# Patient Record
Sex: FEMALE | Race: WHITE
[De-identification: names, ages, dates, MRNs, and addresses within clinical notes are randomized per-mention and may not be internally consistent; named-entity substitution may affect disease eponyms.]

---

## 2017-01-29 ENCOUNTER — HOSPITAL ENCOUNTER (OUTPATIENT)
Dept: HOSPITAL 80 - FIMAGING | Age: 62
End: 2017-01-29
Attending: NURSE PRACTITIONER
Payer: MEDICAID

## 2017-01-29 DIAGNOSIS — M25.542: ICD-10-CM

## 2017-01-29 DIAGNOSIS — M50.31: Primary | ICD-10-CM

## 2017-01-29 DIAGNOSIS — Z85.3: ICD-10-CM

## 2017-01-29 DIAGNOSIS — M25.532: ICD-10-CM

## 2017-01-29 DIAGNOSIS — M50.321: ICD-10-CM

## 2017-01-29 NOTE — DX
Left Wrist, Three Views



History: Wrist pain. History of metastatic breast cancer.



Findings: There is diffuse demineralization. No evidence for acute fracture or dislocation. No signif
icant joint narrowing or periarticular erosion.



Impression: Diffuse demineralization. No definite acute fracture.

## 2017-01-29 NOTE — DX
Cervical Spine, Two Views



History: Pain. History of metastatic breast cancer.



Findings: No evidence for a fracture. No significant spondylolisthesis. Facet arthropathy is seen hernesto
aterally at multiple levels. This is more severe on the left at C2-C3, C3-C4, and C4-C5. Uncovertebra
l joint hypertrophy is seen bilaterally. No evidence for aggressive osseous lesion. No evidence for p
revertebral soft tissue swelling.



Impression: Multilevel degenerative joint disease of the cervical spine, more severe on the left at C
2-C3 through C4-C5.

## 2017-05-10 ENCOUNTER — HOSPITAL ENCOUNTER (EMERGENCY)
Dept: HOSPITAL 80 - CED | Age: 62
Discharge: LEFT BEFORE DISCHARGE | End: 2017-05-10
Payer: MEDICAID

## 2017-05-10 VITALS — OXYGEN SATURATION: 91 % | SYSTOLIC BLOOD PRESSURE: 170 MMHG | DIASTOLIC BLOOD PRESSURE: 112 MMHG | HEART RATE: 88 BPM

## 2017-05-10 VITALS — RESPIRATION RATE: 28 BRPM

## 2017-05-10 VITALS — TEMPERATURE: 98.6 F

## 2017-05-10 DIAGNOSIS — Z85.118: ICD-10-CM

## 2017-05-10 DIAGNOSIS — J90: ICD-10-CM

## 2017-05-10 DIAGNOSIS — J98.11: ICD-10-CM

## 2017-05-10 DIAGNOSIS — Z85.3: ICD-10-CM

## 2017-05-10 DIAGNOSIS — R09.02: Primary | ICD-10-CM

## 2017-05-10 LAB
% IMMATURE GRANULYOCYTES: 0.4 % (ref 0–1.1)
ABSOLUTE IMMATURE GRANULOCYTES: 0.02 10^3/UL (ref 0–0.1)
ABSOLUTE NRBC COUNT: 0 10^3/UL (ref 0–0.01)
ADD DIFF?: NO
ADD MORPH?: NO
ADD SCAN?: NO
ALBUMIN SERPL-MCNC: 3.7 G/DL (ref 3.5–5)
ALP SERPL-CCNC: 94 IU/L (ref 38–126)
ALT SERPL-CCNC: 46 IU/L (ref 9–52)
ANION GAP SERPL CALC-SCNC: 14 MEQ/L (ref 8–16)
APTT BLD: 31.6 SEC (ref 23–38)
AST SERPL-CCNC: 42 IU/L (ref 14–46)
ATYPICAL LYMPHOCYTE FLAG: 10 (ref 0–99)
BILIRUB SERPL-MCNC: 0.8 MG/DL (ref 0.1–1.4)
BILIRUBIN-CONJUGATED: 0.5 MG/DL (ref 0–0.5)
BILIRUBIN-UNCONJUGATED: 0.3 MG/DL (ref 0–1.1)
CALCIUM SERPL-MCNC: 9.1 MG/DL (ref 8.5–10.4)
CHLORIDE SERPL-SCNC: 100 MEQ/L (ref 97–110)
CK-MB INTERPRETATION: POSITIVE
CO2 SERPL-SCNC: 23 MEQ/L (ref 22–31)
CREAT SERPL-MCNC: 0.3 MG/DL (ref 0.6–1)
CREATINE KINASE-MB FRACTION: 5.74 NG/ML (ref 0–4.55)
ERYTHROCYTE [DISTWIDTH] IN BLOOD BY AUTOMATED COUNT: 13.5 % (ref 11.5–15.2)
FRAGMENT RBC FLAG: 0 (ref 0–99)
GFR SERPL CREATININE-BSD FRML MDRD: > 60 ML/MIN/{1.73_M2}
GLUCOSE SERPL-MCNC: 93 MG/DL (ref 70–100)
HCT VFR BLD CALC: 45.1 % (ref 38–47)
HGB BLD-MCNC: 15.7 G/DL (ref 12.6–16.3)
INR PPP: 1.01 (ref 0.83–1.16)
LEFT SHIFT FLG: 0 (ref 0–99)
LIPEMIA HEMOLYSIS FLAG: 90 (ref 0–99)
MAGNESIUM SERPL-MCNC: 1.8 MG/DL (ref 1.6–2.3)
MCH RBC BLDCO QN: 34.1 PG (ref 27.9–34.1)
MCHC RBC AUTO-ENTMCNC: 34.8 G/DL (ref 32.4–36.7)
MCV RBC AUTO: 98 FL (ref 81.5–99.8)
NRBC-AUTO%: 0 % (ref 0–0.2)
PLATELET # BLD: 240 10^3/UL (ref 150–400)
PLATELET CLUMPS FLAG: 20 (ref 0–99)
PMV BLD AUTO: 9 FL (ref 8.7–11.7)
POTASSIUM SERPL-SCNC: 4.2 MEQ/L (ref 3.5–5.2)
PROT SERPL-MCNC: 6.8 G/DL (ref 6.3–8.2)
PROTHROMBIN TIME: 13 SEC (ref 12–15)
RBC # BLD AUTO: 4.6 10^6/UL (ref 4.18–5.33)
SODIUM SERPL-SCNC: 137 MEQ/L (ref 134–144)
TROPONIN I SERPL-MCNC: < 0.012 NG/ML (ref 0–0.03)

## 2017-05-10 NOTE — CPEKG
Heart Rate: 101

RR Interval: 594

P-R Interval: 140

QRSD Interval: 82

QT Interval: 356

QTC Interval: 462

P Axis: 55

QRS Axis: 29

T Wave Axis: 38

EKG Severity - OTHERWISE NORMAL ECG -

EKG Impression: SINUS TACHYCARDIA

Electronically Signed By: Amandeep Valencia 11-May-2017 14:25:43

## 2017-05-10 NOTE — EDPHY
H & P


HPI/ROS: 





HPI





CHIEF COMPLAINT:  Shortness of breath





HISTORY OF PRESENT ILLNESS:  Patient very pleasant 62-year-old female she does 

have significant past medical history for breast cancer metastatic to her lungs 

and bone, she has a left pleural chest tube left posterior region that she 

drained yesterday.  She also has a history of muscular dystrophy, as well as 

anxiety. History of pneumonia.  She presents emergency room feeling short of 

breath progressively worse over the past week.  No fever.  She does have a 

cough but nonproductive.  She does wear oxygen at night only and CPAP.  Main 

complaint is worsening shortness of breath over the past week.  She tells me 

she has no history of PE.  She does tell me she has metastatic breast CA to her 

lung.  She does see Dr. Negrete with Oncology but is not undergoing any treatment 

at this time.





Past Medical History:  Breast CA, metastatic to lung and bone, muscular 

dystrophy, anxiety





Past Surgical History:  Mastectomy, left posterior chest pleural catheter





Social History:  Smokes tobacco, denies drugs alcohol 





Family History:  Noncontributory








ROS   


REVIEW OF SYSTEMS:


A comprehensive 10 point review of systems is otherwise negative aside from 

elements mentioned in the history of present illness.








Exam   


Constitutional   triage nursing summary reviewed, vital signs reviewed, awake/

alert.  Tachypnea, low O2.


Eyes   normal conjunctivae and sclera, EOMI, PERRLA. 


HENT   normal inspection, atraumatic, moist mucus membranes, no epistaxis, neck 

supple/ no meningismus, no raccoon eyes. 


Respiratory   tachypnea, decreased breath sounds bilaterally worse on the left 

than right


Cardiovascular   rate normal, regular rhythm, no murmur, no edema, distal 

pulses normal. 


Gastrointestinal   soft, non-tender, no rebound, no guarding, normal bowel 

sounds, no distension, no pulsatile mass. 


Genitourinary   no CVA tenderness. 


Musculoskeletal  no midline vertebral tenderness, full range of motion, no calf 

swelling, no tenderness of extremities, no meningismus, good pulses, 

neurovascularly intact.


Skin   pink, warm, & dry, no rash, skin atraumatic. 


Neurologic   awake, alert and oriented x 3, AAOx3, moves all 4 extremities 

equally, motor intact, sensory intact, CN II-XII intact, normal cerebellar, 

normal vision, normal speech. 


Psychiatric   normal mood/affect. 


Heme/Lymph/Immune   no lymphadenopathy.





Differential Diagnosis:  Includes but is not limited to in a particular order, 

pneumonia, pulmonary embolism, pleural effusion, pneumothorax, CHF, ACS





Medical Decision Making:  Plan for patient IV establishment full cardiac monitor

, EKG, IV fluid bolus, lactic acid blood cultures, two view chest x-ray D-dimer 

for PE.  DuoNeb breathing treatment.





Re-evaluation:








EKG interpretation by me on record in [employer] system.  Impression time of 

EKG 17 10, this is sinus tachycardia rate of 101. Do not appreciate acute 

ischemic change.  No ST elevations significant ST depression T-wave abnormality.





[id number]:  Patient received breathing treatment she states admitted very mild 

difference however still short of breath.  Mildly tachypneic.  X-ray has been 

reviewed shows left-sided pleural effusion haziness throughout both lung 

fields.  Is noted this patient has a positive D-dimer.  The setting of 

tachycardia and shortness of breath history of cancer will need to perform a CT 

angiogram to rule out pulmonary embolism.  Troponin still pending.





CT scan of the angiogram chest with IV contrast.  The results of the study are 

this shows no pulmonary embolism however does show moderate left-sided pleural 

effusion, ear broncho backwards and atelectasis, no focal pneumonia, osseous 

lesion left 7th rib with a pathological fracture, possible small osseous lesion 

left left 11th rib otherwise no acute other pathology in the chest..  The study 

was read by Dr. Feng Abreu  I viewed the images myself on the PACS system.





1941:  I had a very long discussion with the patient she is refusing hospital 

admission.  I explained that I would like to hospitalize her for tachypnea and 

low oxygen level left-sided pleural effusion atelectasis.  Given that she has 

muscular dystrophy in her muscles are chronically weak has a respiratory 

process going on she understands would like to keep her in the hospital for 

observation close monitor respiratory status.  However she is declining 

hospitalization refusing hospitalization.  She would like to go home.  I 

explained that she will need to sign out against medical advice as I explained 

to her that I would really like to keep her and that if she leaves there is a 

great chance of increasing morbidity, even mortality or worsening respiratory 

status.  Respiratory decline.  She still is refusing hospitalization she does 

understand the risk of this.  She would like to go home.  I explained I will 

prescribe her azithromycin at her request an albuterol inhaler however if she 

has any worsening symptoms at any time including worsening trouble breathing, 

shortness of breath chest pain does not feel well she needs to seek medical 

attention in the emergency room she understands.  Patient has signed out 

against medical advice.  AMA form has been signed.  Witnessed by Aminah Rojas RN. 


Source: Patient





- Personal History


Tetanus Vaccine Date: WITHIN 10 YRS





- Medical/Surgical History


Hx Asthma: No


Hx Chronic Respiratory Disease: Yes


Hx Diabetes: No


Hx Cardiac Disease: No


Hx Renal Disease: No


Hx Cirrhosis: No


Hx Alcoholism: No


Hx HIV/AIDS: No


Hx Splenectomy or Spleen Trauma: No


Other PMH: MD,MASTECTOMY,KIDNEY STONES,RESP-PNEUMONIA X2 THIS PAST YEAR(2014)

WEARS HOME O2 AT HS





- Social History


Smoking Status: Never smoked


Constitutional: 


 Initial Vital Signs











Temperature (C)  37 C   05/10/17 16:37


 


Heart Rate  102 H  05/10/17 16:37


 


Respiratory Rate  18   05/10/17 16:37


 


Blood Pressure  139/91 H  05/10/17 16:37


 


O2 Sat (%)  91 L  05/10/17 16:37








 











O2 Delivery Mode               Nasal Cannula


 


O2 (L/minute)                  2














Allergies/Adverse Reactions: 


 





Sulfa (Sulfonamide Antibiotics) Allergy (Intermediate, Verified 02/19/15 13:01)


 








Home Medications: 














 Medication  Instructions  Recorded


 


Muscle Relaxers  08/17/15


 


AZITHROMYCIN [Z-PACK] 250 mg PO DAILY #6 tab 05/10/17


 


Albuterol [Proventil Inhaler HFA 1 - 2 puffs IH Q4H #1 mdi 05/10/17





(*)]  














Medical Decision Making





- Diagnostics


Imaging Results: 


 Imaging Impressions





Chest X-Ray  05/10/17 17:02


Impression:


1. Stable position of [place] drain left hemithorax.


2. Stable mild to moderate left effusion with possible small right effusion 

layering posteriorly.


3. Lytic lesion suspected posterior lateral left seventh rib.








Chest/Thorax CTA  05/10/17 17:57


Impression:


 


1. There is no CT evidence of pulmonary artery thromboemboli.


2. Status post right mastectomy and right axillary nichelle dissection.


3. Moderate left pleural effusion with a percutaneous drain present and left 

lower lobe atelectasis and infiltrate with peribronchial thickening, left 

greater than right. There is also some very mild subsegmental atelectasis at 

the right costophrenic angle.


4. Osseous metastases, with a pathologic fracture of the left seventh posterior 

rib.


 


Findings were discussed with Christian Molina MD  at 19:24, on 5/10/2017.


 














- Data Points


Laboratory Results: 


 Laboratory Results





 05/10/17 17:20 





 05/10/17 17:20 





 











  05/10/17 05/10/17 05/10/17





  17:20 17:20 17:20


 


WBC      





    


 


RBC      





    


 


Hgb      





    


 


Hct      





    


 


MCV      





    


 


MCH      





    


 


MCHC      





    


 


RDW      





    


 


Plt Count      





    


 


MPV      





    


 


Neut % (Auto)      





    


 


Lymph % (Auto)      





    


 


Mono % (Auto)      





    


 


Eos % (Auto)      





    


 


Baso % (Auto)      





    


 


Nucleat RBC Rel Count      





    


 


Absolute Neuts (auto)      





    


 


Absolute Lymphs (auto)      





    


 


Absolute Monos (auto)      





    


 


Absolute Eos (auto)      





    


 


Absolute Basos (auto)      





    


 


Absolute Nucleated RBC      





    


 


Immature Gran %      





    


 


Immature Gran #      





    


 


PT      13.0 SEC SEC





     (12.0-15.0) 


 


INR      1.01 





     (0.83-1.16) 


 


APTT      31.6 SEC SEC





     (23.0-38.0) 


 


D-Dimer      0.94 ug/mLFEU H ug/mLFEU





     (0.00-0.50) 


 


VBG Lactic Acid  1.0 mmol/L mmol/L    





   (0.7-2.1)   


 


Sodium    137 mEq/L mEq/L  





    (134-144)  


 


Potassium    4.2 mEq/L mEq/L  





    (3.5-5.2)  


 


Chloride    100 mEq/L mEq/L  





    ()  


 


Carbon Dioxide    23 mEq/l mEq/l  





    (22-31)  


 


Anion Gap    14 mEq/L mEq/L  





    (8-16)  


 


BUN    10 mg/dL mg/dL  





    (7-23)  


 


Creatinine    0.3 mg/dL L mg/dL  





    (0.6-1.0)  


 


Estimated GFR    > 60   





    


 


Glucose    93 mg/dL mg/dL  





    ()  


 


Calcium    9.1 mg/dL mg/dL  





    (8.5-10.4)  


 


Magnesium    1.8 mg/dL mg/dL  





    (1.6-2.3)  


 


Total Bilirubin    0.8 mg/dL mg/dL  





    (0.1-1.4)  


 


Conjugated Bilirubin    0.5 mg/dL mg/dL  





    (0.0-0.5)  


 


Unconjugated Bilirubin    0.3 mg/dL mg/dL  





    (0.0-1.1)  


 


AST    42 IU/L IU/L  





    (14-46)  


 


ALT    46 IU/L IU/L  





    (9-52)  


 


Alkaline Phosphatase    94 IU/L IU/L  





    ()  


 


Creatine Kinase    121 IU/L IU/L  





    (0-156)  


 


CK-MB (CK-2) Fraction    5.74 ng/mL H ng/mL  





    (0-4.55)  


 


CK-MB (CK-2) %    4.7 % H %  





    (0.0-4.0)  


 


Creatine Kinase Interp    POSITIVE  H   





    (NEGATIVE)  


 


Troponin I    < 0.012 ng/mL ng/mL  





    (0-0.034)  


 


NT-Pro-B Natriuret Pep    215 pg/mL H pg/mL  





    (0-125)  


 


Total Protein    6.8 g/dL g/dL  





    (6.3-8.2)  


 


Albumin    3.7 g/dL g/dL  





    (3.5-5.0)  


 


Lipase    96.0 IU/L IU/L  





    ()  














  05/10/17





  17:20


 


WBC  5.11 10^3/uL 10^3/uL





   (3.80-9.50) 


 


RBC  4.60 10^6/uL 10^6/uL





   (4.18-5.33) 


 


Hgb  15.7 g/dL g/dL





   (12.6-16.3) 


 


Hct  45.1 % %





   (38.0-47.0) 


 


MCV  98.0 fL fL





   (81.5-99.8) 


 


MCH  34.1 pg pg





   (27.9-34.1) 


 


MCHC  34.8 g/dL g/dL





   (32.4-36.7) 


 


RDW  13.5 % %





   (11.5-15.2) 


 


Plt Count  240 10^3/uL 10^3/uL





   (150-400) 


 


MPV  9.0 fL fL





   (8.7-11.7) 


 


Neut % (Auto)  62.3 % %





   (39.3-74.2) 


 


Lymph % (Auto)  24.9 % %





   (15.0-45.0) 


 


Mono % (Auto)  10.6 % %





   (4.5-13.0) 


 


Eos % (Auto)  1.2 % %





   (0.6-7.6) 


 


Baso % (Auto)  0.6 % %





   (0.3-1.7) 


 


Nucleat RBC Rel Count  0.0 % %





   (0.0-0.2) 


 


Absolute Neuts (auto)  3.19 10^3/uL 10^3/uL





   (1.70-6.50) 


 


Absolute Lymphs (auto)  1.27 10^3/uL 10^3/uL





   (1.00-3.00) 


 


Absolute Monos (auto)  0.54 10^3/uL 10^3/uL





   (0.30-0.80) 


 


Absolute Eos (auto)  0.06 10^3/uL 10^3/uL





   (0.03-0.40) 


 


Absolute Basos (auto)  0.03 10^3/uL 10^3/uL





   (0.02-0.10) 


 


Absolute Nucleated RBC  0.00 10^3/uL 10^3/uL





   (0-0.01) 


 


Immature Gran %  0.4 % %





   (0.0-1.1) 


 


Immature Gran #  0.02 10^3/uL 10^3/uL





   (0.00-0.10) 


 


PT  





  


 


INR  





  


 


APTT  





  


 


D-Dimer  





  


 


VBG Lactic Acid  





  


 


Sodium  





  


 


Potassium  





  


 


Chloride  





  


 


Carbon Dioxide  





  


 


Anion Gap  





  


 


BUN  





  


 


Creatinine  





  


 


Estimated GFR  





  


 


Glucose  





  


 


Calcium  





  


 


Magnesium  





  


 


Total Bilirubin  





  


 


Conjugated Bilirubin  





  


 


Unconjugated Bilirubin  





  


 


AST  





  


 


ALT  





  


 


Alkaline Phosphatase  





  


 


Creatine Kinase  





  


 


CK-MB (CK-2) Fraction  





  


 


CK-MB (CK-2) %  





  


 


Creatine Kinase Interp  





  


 


Troponin I  





  


 


NT-Pro-B Natriuret Pep  





  


 


Total Protein  





  


 


Albumin  





  


 


Lipase  





  











Medications Given: 


 








Discontinued Medications





Albuterol/Ipratropium (Duoneb)  3 ml IH EDNOW ONE


   Stop: 05/10/17 17:03


   Last Admin: 05/10/17 17:45 Dose:  3 ml


Sodium Chloride (Ns)  1,000 mls @ 0 mls/hr IV ONCE ONE


   PRN Reason: Wide Open


   Stop: 05/10/17 17:03


   Last Admin: 05/10/17 17:40 Dose:  1,000 mls








Departure





- Departure


Disposition: Against Medical Advice


Clinical Impression: 


 Hypoxia, Pleural effusion, Atelectasis





Dyspnea


Qualifiers:


 Dyspnea type: unspecified Qualified Code(s): R06.00 - Dyspnea, unspecified





Condition: Fair


Instructions:  Dyspnea (ED), Hypoxia (ED)


Additional Instructions: 


1. Please return emergency room if he changed her mind about hospital admission.


2. Return emergency room immediately if you have worsening symptoms includes 

worsening shortness of breath, chest pain you do not feel well.


Referrals: 


RIYA MOYA,. [Primary Care Provider] - As per Instructions


Prescriptions: 


Albuterol [Proventil Inhaler HFA (*)] 1 - 2 puffs IH Q4H #1 mdi


AZITHROMYCIN [Z-PACK] 250 mg PO DAILY #6 tab

## 2017-05-17 ENCOUNTER — HOSPITAL ENCOUNTER (EMERGENCY)
Dept: HOSPITAL 80 - CED | Age: 62
Discharge: HOME | End: 2017-05-17
Payer: MEDICAID

## 2017-05-17 VITALS
TEMPERATURE: 98.6 F | HEART RATE: 108 BPM | RESPIRATION RATE: 20 BRPM | DIASTOLIC BLOOD PRESSURE: 92 MMHG | OXYGEN SATURATION: 87 % | SYSTOLIC BLOOD PRESSURE: 165 MMHG

## 2017-05-17 DIAGNOSIS — C79.81: Primary | ICD-10-CM

## 2017-05-17 DIAGNOSIS — M89.9: ICD-10-CM

## 2017-05-17 DIAGNOSIS — R20.2: ICD-10-CM

## 2017-05-17 DIAGNOSIS — M84.48XA: ICD-10-CM

## 2017-05-17 NOTE — EDPHY
H & P


Stated Complaint: LEFT POSTERIOR LUNG DRAINAGE TUBE PAIN AT SITE SINCE SATURDAY


Time Seen by Provider: 05/17/17 16:39


HPI/ROS: 





This patient has metastatic breast cancer and presents with increasing left 

posterior chest wall pain. She is concerned about possible infection at her 

pulmonary catheter site posteriorly on the left.  She was seen here 7 days ago 

by Dr. Molina who work the patient up for increased dyspnea at that time and 

given an elevated D-dimer at that time she underwent CT angio chest which ruled 

out PE, confirmed longstanding left-sided effusion for which she has pulmonic 

drain in revealed a lytic lesion with pathological fracture of the left 7th rib 

posteriorly, lytic lesion to the level throat posteriorly and lytic lesion to 

T12 centrally.  The patient admits that she is not taking Percocet because it 

makes her constipated but reports that she had difficulty sleeping due to the 

posterior rib pain last night.  She feels that her dyspnea has improved.  She 

is interested in having more antibiotics and she felt that her symptoms of 

dyspnea improved with treatment with the Z-Mckinley that she received during her 

last visit for treatment of possible bronchitis.





ROS:  No fevers or chills. No other constitutional symptoms except baseline 

fatigue and weakness


HEENT:  No complaints except for a sore throat in the mornings for quite some 

time.  She requests strep test.


Pulmonary:  Ongoing dyspnea somewhat improved from last week and at baseline 

currently.  She reports 800 out from her pulmonic drain over the last 24 hours.


Cardiovascular:  No lightheadedness.  No heart palpitations.  No anterior chest 

pain. No lower extremity swelling or calf pain currently


GI:  She has chronic nausea and occasionally vomits.  No recent change in that.

  No belly pain at this time.


Neuro:  She reports feeling of paresthesias occasional numbness around the T12 

dermatome anteriorly.  No other new neuro symptoms.


Integumentary:  She reports some scabbing at the site of the pulmonic drain 

posteriorly.  No skin rash.


Complete review of symptoms is otherwise negative.


Source: Patient


Exam Limitations: No limitations





- Personal History


Tetanus Vaccine Date: WITHIN 10 YRS





- Medical/Surgical History


PMH: 





Metastatic lung cancer





Patient was on estrogen inhibitor that she stop taking in February of her own 

accord for reasons that are not entirely clear at this time.








Hx Asthma: No


Hx Chronic Respiratory Disease: Yes


Hx Diabetes: No


Hx Cardiac Disease: No


Hx Renal Disease: No


Hx Cirrhosis: No


Hx Alcoholism: No


Hx HIV/AIDS: No


Hx Splenectomy or Spleen Trauma: No


Other PMH: MD,MASTECTOMY,KIDNEY STONES,RESP-PNEUMONIA X2 THIS PAST YEAR(2014)

WEARS HOME O2 AT HS





- Social History


Smoking Status: Never smoked


Constitutional: 


 Initial Vital Signs











Temperature (C)  37.0 C   05/17/17 16:20


 


Heart Rate  108 H  05/17/17 16:20


 


Respiratory Rate  20   05/17/17 16:20


 


Blood Pressure  165/92 H  05/17/17 16:20


 


O2 Sat (%)  87 L  05/17/17 16:20








 











O2 Delivery Mode               Room Air














Allergies/Adverse Reactions: 


 





Sulfa (Sulfonamide Antibiotics) Allergy (Intermediate, Verified 05/17/17 16:37)


 








Home Medications: 














 Medication  Instructions  Recorded


 


Muscle Relaxers  08/17/15


 


AZITHROMYCIN [Z-PACK] 250 mg PO DAILY #6 tab 05/10/17


 


Albuterol [Proventil Inhaler HFA 1 - 2 puffs IH Q4H #1 mdi 05/10/17





(*)]  


 


Docusate Sodium [Colace 100 MG (*)] 100 mg PO BID #30 cap 05/17/17


 


Lidocaine 5% [Lidoderm 5% Patch 2 ea TD DAILY #30 patch 05/17/17





(*)]  


 


oxyCODONE/APAP 5/325 [Percocet 1 - 2 tab PO Q4-6PRN PRN #12 tab 05/17/17





5/325 (*)]  














Medical Decision Making


ED Course/Re-evaluation: 





Discussion:  I had an extensive talk with this patient regarding her current 

symptoms and possibilities for workup.  Clinically I feel her posterior rib 

pain is consistent with the 7th rib pathologic fracture and 11th lytic lesion 

noted on her CT angio chest on 5/10/17.  I think that her paresthesias & 

intermittent numbness in the T12 dermatome correspond to her T12 lytic lesion.  

However, she has no acute motor sx's or long-track symptoms/findings. She 

seemed r somewhat reassured knowing that there is some anatomic correlation 

between her current somatic symptoms and underlying lytic lesions and 

pathologic fx noted on CT chest results from a week ago.  She is not interested 

in pursuing further diagnostic workup at this time & again current findings are 

c/w findings on workup from 7 days PTA.  We had a jessy conversation about 

whether she feels she wants to enter a palliative approach to her metastatic 

cancer versus further cancer treatment.  She agrees to follow up with Dr. Negrete

, her oncologist to discuss her treatment plan.  I also discussed other 

analgesic possibilities including lidocaine patch, and NSAIDs.  Patient 

requests Percocet here and is given a Percocet PO.





- Data Points


Laboratory Results: 


 











  05/17/17 05/17/17





  Unknown 17:40


 


Group A Strep Screen    NEGATIVE 





    (NEGATIVE) 


 


Group A Strep DNA  Pending   





   











Medications Given: 


 








Discontinued Medications





Ondansetron HCl (Zofran Odt)  4 mg PO EDNOW ONE


   Stop: 05/17/17 16:59


   Last Admin: 05/17/17 17:00 Dose:  4 mg


Oxycodone/Acetaminophen (Percocet 5/325)  1 tab PO EDNOW ONE


   Stop: 05/17/17 17:22


   Last Admin: 05/17/17 17:25 Dose:  1 tab








Departure





- Departure


Disposition: Home, Routine, Self-Care


Clinical Impression: 


 Pathologic 7th rib fracture, T12 lytic lesion, Paresthesias, Metastatic breast 

cancer





Condition: Good


Instructions:  Rib Fracture (ED)


Additional Instructions: 


Diagnoses:  1. Pathologic fracture to the left 7th rib from lytic bony lesion 

2.  11th posterior rib lytic lesion 3. T12 vertebrae lytic lesion





I think that your current pain is attributable to your rib lesions and fracture.





Paresthesias or/tingling in the anterior belly are likely related to the 

thoracic spine lesion.





Plan:  Call Dr. Negrete tomorrow morning to arrange follow-up appointment for 

sometime this week to discuss your plan for your cancer treatment.





Consider adding Lidoderm patches over painful areas of your ribs to help 

control the pain





Take Colace stool softener while your on the Percocet to prevent constipation





Remember that she can take Tylenol with the Percocet with a dose of 1000 mg per 

6 hours if needed for pain control.





Return for any significant worsening despite the treatment plan.








Referrals: 


RIYA MOYA,. [Primary Care Provider] - As per Instructions


Leon Negrete MD [Medical Doctor] - As per Instructions


Prescriptions: 


Docusate Sodium [Colace 100 MG (*)] 100 mg PO BID #30 cap


Lidocaine 5% [Lidoderm 5% Patch (*)] 2 ea TD DAILY #30 patch


oxyCODONE/APAP 5/325 [Percocet 5/325 (*)] 1 - 2 tab PO Q4-6PRN PRN #12 tab


 PRN Reason: Pain

## 2017-10-27 ENCOUNTER — HOSPITAL ENCOUNTER (EMERGENCY)
Dept: HOSPITAL 80 - FED | Age: 62
Discharge: HOME | End: 2017-10-27
Payer: MEDICAID

## 2017-10-27 ENCOUNTER — HOSPITAL ENCOUNTER (EMERGENCY)
Dept: HOSPITAL 80 - CED | Age: 62
Discharge: LEFT BEFORE DISCHARGE | End: 2017-10-27
Payer: MEDICAID

## 2017-10-27 VITALS — RESPIRATION RATE: 16 BRPM | DIASTOLIC BLOOD PRESSURE: 90 MMHG | SYSTOLIC BLOOD PRESSURE: 142 MMHG | HEART RATE: 98 BPM

## 2017-10-27 VITALS — TEMPERATURE: 98.6 F

## 2017-10-27 VITALS — OXYGEN SATURATION: 93 %

## 2017-10-27 DIAGNOSIS — C50.919: ICD-10-CM

## 2017-10-27 DIAGNOSIS — Z53.21: Primary | ICD-10-CM
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
## 2017-10-27 DIAGNOSIS — F17.200: ICD-10-CM

## 2017-10-27 DIAGNOSIS — T85.698A: Primary | ICD-10-CM

## 2017-10-27 NOTE — EDPHY
General


Narrative: 





CHIEF COMPLAINT: 


Malfunction of chest tube





HISTORY OF PRESENT ILLNESS: 


Patient presents with complaints of possible in a function of chest tube this 

started on Wednesday evening.  She presents with her daughter in law at 

bedside.  She says that she has a chest tube in place from pleural effusion 

that was placed 2 years ago at [facility].  She does not known the 

name of the physician, but she thinks was a pulmonologist.  She was sent from 

[facility] for this.  She has had this in place the entire time.

  She drained every other day.  She has not been told to have it removed.  She 

has a history of metastatic breast cancer status post mastectomy with no 

current treatment.  She says that the tube was leaking the bottom of it 

starting Wednesday.  She has had no leakage around the insertion site but the 

suture has broken free from that.  The tube has not been changed and is the 

original to.  No chest pain.  No fever.  No cough.  No shortness of breath.  

She is dependent on 2 L nasal cannula during the day and CPAP at night.  No 

trauma or injury.  No other associated complaints or modifying factors.





REVIEW OF SYSTEMS:


Ten systems reviewed and are negative unless otherwise noted in the HPI





PCP:


[facility]





SPECIALISTS:


Dr. Negrete, oncology


Dr. Lugo, urology





PAST MEDICAL HISTORY: 


Metastatic breast cancer, muscular dystrophy





PAST SURGICAL HISTORY:


Right mastectomy, right ureteral stent placement and removal





SOCIAL HISTORY:


Nonsmoker.  Lives with her family in [place].





FAMILY HISTORY:


Noncontributory





EXAMINATION


General Appearance:  Alert, no distress.  Wheelchair dependent.  Frail


Head: normocephalic, atraumatic


Eyes:  Pupils equal and round, no conjunctival pallor or injection


ENT, Mouth:  Mucous membranes moist.  Uvula midline.  Airway patent.  Nasal 

cannula placement


Neck:  Normal inspection, supple, non-tender


Respiratory:  Scattered rhonchi.  No consolidation or diminishment.  There is a 

chest tube for a posterior approach on the left side subscapular region.  The 

sutures are not in place for the attachment anchor.  There is no surrounding 

erythema or purulence.  No induration.  No signs of infection or leakage.


Cardiovascular:  Regular rate and rhythm.  No murmur


Neurological:  A&O, nonfocal, baseline neuro status with decreased range of 

motion of the arms and legs consistent with MD.


Skin:  Warm and dry, no rash.  No petechiae purpura.  There is no surrounding 

erythema, purulence or induration at the insertion site of the chest tube.  The 

sutures have come free from the skin and are still in the wings of the anchor 

of the chest tube.


Psychiatric:  Mood and affect normal





DIFFERENTIAL DIAGNOSES:


Including but not limited to tube malfunction, pneumothorax, hemothorax, 

pleural effusion








MDM:


4:15 p.m.


Tube malfunction of a chronic chest tube from the left approach.  She has no 

complaints of pneumonia or infection.  She is only concerned that the tube will 

leak and she will accumulate air.  She was told to come to the emergency 

department for tube exchange.  I have inspected the remainder of the tube and 

it is normal in appearance despite the chronicity.  There is no leakage other 

than the very distal end of the tube, and this is a serous leakage from the 

tube.  She is in no acute distress.





4:30 p.m.


Case discussed with on-call oncologist, Dr. Jennings.  He is aware the patient's 

scenario.  He is comfortable with me temporizing the tube.  If she tolerates 

that and her chest x-ray is unremarkable.  He will help have the patient 

evaluated for interventional radiology tube exchange next week.  Case discussed 

with Dr. Molina and he will evaluate as well.





4:55 p.m.


I was able to remove the distal 2 cm of the chest tube.  This was done while 

clamped in 2 places to ensure that there was no air leak.  I was then able to 

replace the existing valve back into the tube.  I removed the clamps and there 

was no leakage of any serous fluid.  The wings of the catheter at the insertion 

site had come free from the skin.  I anesthetized the area with 1% lidocaine 

plain, 5 mL.  I then sutured the wings back to the skin using 2-0 silk.  Two 

simple interrupted sutures.  This was done without complication.  Tolerated 

well.  I reviewed the chest x-ray with Dr. Molina.  I have discussed the x-ray 

with Dr. Jennings.  While the x-rays not normal, it is stable from previous x-

rays 6 months ago.  There may be some worsening erosion of rib structures.  The 

chest tube is in place and there is no air leak.  There is no significant 

pleural effusion, pneumothorax or hemothorax.  Vital signs are stable.  We are 

all in agreement that the patient may be discharged home at this time.  Dr. Jennings and/or Dr. Negrete will help arrange for interventional radiology tube 

exchange if it is warranted next week.  The patient and her family member are 

comfortable with this plan.  She was very appreciative of the tube being 

temporized.  She will be discharged home stable conditions with strict ED 

precautions for this.











- History


Smoking Status: Current every day smoker





- Objective


Vital Signs: 


 Initial Vital Signs











Temperature (C)  98.6 F   10/27/17 15:30


 


Heart Rate  108 H  10/27/17 15:30


 


Respiratory Rate  20   10/27/17 15:30


 


Blood Pressure  151/94 H  10/27/17 15:30


 


O2 Sat (%)  91 L  10/27/17 15:30








 











O2 Delivery Mode               Nasal Cannula


 


O2 (L/minute)                  2














Allergies/Adverse Reactions: 


 





Sulfa (Sulfonamide Antibiotics) Allergy (Intermediate, Verified 10/27/17 15:29)


 








Home Medications: 














 Medication  Instructions  Recorded


 


Muscle Relaxers  08/17/15


 


Albuterol [Proventil Inhaler HFA 1 - 2 puffs IH Q4H #1 mdi 05/10/17





(*)]  


 


Docusate Sodium [Colace 100 MG (*)] 100 mg PO BID #30 cap 05/17/17


 


Lidocaine 5% [Lidoderm 5% Patch 2 ea TD DAILY #30 patch 05/17/17





(*)]  


 


oxyCODONE/APAP 5/325 [Percocet 1 - 2 tab PO Q4-6PRN PRN #12 tab 05/17/17





5/325 (*)]  














Departure





- Departure


Disposition: Home, Routine, Self-Care


Clinical Impression: 


 Chest tube in place, Metastatic breast cancer





Complication of chest tube


Qualifiers:


 Encounter type: initial encounter Qualified Code(s): T85.9XXA - Unspecified 

complication of internal prosthetic device, implant and graft, initial encounter





Condition: Good


Instructions:  How to Care for Your Chest or Abdominal Catheter (ED)


Additional Instructions: 


1. Continue Your tube drains as previously instructed.


2. Return to ED for leakage, chest pain or shortness of breath


Referrals: 


CLINICA,NO SPECIFIC [Other] - As per Instructions


ABIEL Jennings MD [Medical Doctor] - As per Instructions


Leon Negrete MD [Medical Doctor] - As per Instructions

## 2017-12-15 ENCOUNTER — HOSPITAL ENCOUNTER (OUTPATIENT)
Dept: HOSPITAL 80 - FIMAGING | Age: 62
End: 2017-12-15
Attending: INTERNAL MEDICINE
Payer: MEDICAID

## 2017-12-15 DIAGNOSIS — J90: Primary | ICD-10-CM

## 2017-12-15 DIAGNOSIS — C50.919: ICD-10-CM

## 2017-12-15 DIAGNOSIS — G71.0: ICD-10-CM

## 2017-12-15 DIAGNOSIS — Z97.8: ICD-10-CM

## 2017-12-15 DIAGNOSIS — M99.88: ICD-10-CM

## 2018-01-28 ENCOUNTER — HOSPITAL ENCOUNTER (INPATIENT)
Dept: HOSPITAL 80 - FED | Age: 63
LOS: 1 days | DRG: 948 | End: 2018-01-29
Attending: INTERNAL MEDICINE | Admitting: INTERNAL MEDICINE
Payer: MEDICAID

## 2018-01-28 VITALS — TEMPERATURE: 97.6 F

## 2018-01-28 DIAGNOSIS — Z88.2: ICD-10-CM

## 2018-01-28 DIAGNOSIS — R41.82: ICD-10-CM

## 2018-01-28 DIAGNOSIS — Z85.3: ICD-10-CM

## 2018-01-28 DIAGNOSIS — G89.3: Primary | ICD-10-CM

## 2018-01-28 DIAGNOSIS — G71.0: ICD-10-CM

## 2018-01-28 DIAGNOSIS — F17.210: ICD-10-CM

## 2018-01-28 DIAGNOSIS — C79.9: ICD-10-CM

## 2018-01-28 LAB — PLATELET # BLD: 218 10^3/UL (ref 150–400)

## 2018-01-28 NOTE — GHP
[f rep st]



                                                            HISTORY AND PHYSICAL





DATE OF ADMISSION:  01/28/2018



CHIEF COMPLAINT:  Pain.



HISTORY OF PRESENT ILLNESS:  This is a 62-year-old female with a history of metastatic breast cancer.
  She has been fighting cancer for many years and was in remission for some time until 2 years ago wh
en she developed metastatic disease.  She has been getting therapy.  Apparently she has been close to
 transitioning to hospice.  Apparently last night, patient had told them that it was time to transiti
on to hospice.  She has had significant pain today and came to the emergency department.  Currently, 
she is lethargic and not verbal.



REVIEW OF SYSTEMS:  Unable to obtain secondary to patient's mental status.



PAST MEDICAL HISTORY:  Metastatic breast cancer, muscular dystrophy.



SOCIAL HISTORY:  Does smoke, a little bit of alcohol as well.



FAMILY HISTORY:  Unknown.



PHYSICAL EXAMINATION:  VITAL SIGNS:  Afebrile, blood pressure is 123/81, heart rate 104, oxygen satur
ation 98% on 4 L.  GENERAL:  The patient is cachectic and in no apparent distress.  HEENT:  Nonicteri
c sclerae.  Dry mucous membranes.  NECK:  Supple.  LUNGS:  Poor effort.  Clear to auscultation bilate
rally.  CARDIOVASCULAR:  Regular rate and rhythm.  No murmurs, rubs, or gallops.  ABDOMEN:  Positive 
bowel sounds.  Soft, nontender, nondistended.  No hepatosplenomegaly.  EXTREMITIES:  No clubbing, cya
nosis, or edema.  SKIN:  Without rash.  Warm, dry, intact.  NEURO:  Essentially obtunded.



LABS:  CBC is normal.  Chemistry does show an elevated CO2 at 32, __________ calcium.



ASSESSMENT:  This is a 62-year-old female with a history of metastatic breast cancer being admitted f
or transition to hospice.



PLAN:  The patient will be admitted and hospice evaluation will be ordered.  I have written for morph
ine and Ativan and antiemetics as needed.





Job #:  [id number]

## 2018-01-28 NOTE — ASMTCMCOM
CM Note

 

CM Note                       

Notes:

Pt presented to the ED via EMS from home for pain related to chemo treatments and altered mental 

status. 



Patient's longtime friend and her former hospice RN, Shahla Us ([phone]), and other 

former caregiver and friend Rajni Garcia ([phone]) arrived to the ED. Spoke with Lawrence at length regarding patient being started on hospice. Per Lawrence, [facility] 

([phone]) went to the p't's home yesterday and completed an informational session but at that 

time the pt was undecided whether she wanted to start hospice. 



[facility] followed up with pt, Rajni Gale and pt's daughter-in-law Xiomy Galindo ([phone]) today 

about admitting to hospice but since the patient's mental status had become more altered, [facility] would 


need patient's POA, her ex- Rian Avitia ([phone]) to sign the necessary paperwork to 


admit. Lawrence made multiple attempts to contact Rian today but he was on a flight back 

home to [place]. 



 This CM was able to speak to Rian who was on his way from [facility] to [facility]. Rian arrived to ED with POA 

paperwork (which was scanned into pt's e-chart). He is willing to sign paperwork to admit pt to 

hospice. Rian, Rajni Gale, Xiomy and several friends/caregivers in the room agree that it would 

be best for patient to be started on hospice.



Spoke with Gillian at [facility] and she tried to see if an RN was available to complete an 

admission tonight and that the RN would have orders for pain control (so pt could potentially dc 

home with hospice tonight), but ultimately an RN was unavailable. 



Pt admitted for pain control with plans for [facility] to assess/admit at 10a.m. Rian is aware he needs to 


be present and will try to arrive by 9:45am. There was discussion about patient being directly 

admitted to [facility] if there is a bed available; this appears to be family and 

friend's preference if possible. 



CM to follow. 

 

Date Signed:  01/28/2018 05:37 PM

Electronically Signed By:Ellen Coto RN

## 2018-01-28 NOTE — EDPHY
H & P


Time Seen by Provider: 01/28/18 14:50


HPI/ROS: 





CHIEF COMPLAINT:  Pain and altered mental status





HISTORY OF PRESENT ILLNESS:  History is from EMS and 2 caregivers who arrived 

with the patient but she is nonverbal.  Apparently she has breast cancer with 

extensive metastases and is transitioning to hospice.  [facility] was out last 

night but could not take her because she was altered and was not able to sign a 

form of for them, and her power of  who was her ex- is 

unreachable even though he lives in [place].


A caregiver said they gave her 2 Percocet and oral Ativan prior to arrival 

because she was in pain at home and during transport she was nonverbal.


1 caregiver says that the patient when told that "it was time "and asked if it 

was okay that hospice get involved she said "yes, "but the other caregiver 

tells me that after hospice was at the house last night the patient told her 

that she "wants to think about it.  "





R further history and review of systems is unobtainable because the patient is 

nonverbal on arrival





PAST MEDICAL HISTORY:  Metastatic breast cancer and muscular dystrophy





Social history:  Still smokes cigarettes and had some scott last night.





General Appearance:  Lethargic, nonverbal


Eyes: No scleral  icterus. 


ENT, Mouth:  Dry mucous membranes.


Respiratory: Normal respiratory effort, breath sounds equal, lungs are clear to 

auscultation.


Cardiovascular:  Regular rate and rhythm.


Gastrointestinal:  Abdomen is soft and non tender.


Neurological: Lethargic, will occasionally smile, no spontaneous movement in 

extremities.


Skin: Warm and dry, no rashes.


Musculoskeletal: No peripheral edema.


Psychiatric:  Unable, nonverbal





Emergency Department course/MDM:


1507: per Anjana Negrete, patient declined hospice last night.  Recommends 

admission to hospitalist service for further evaluation.


1508: Laura for hospitalist.





1649:  POA is arrived, a conversation ongoing between case management and 

hospice as to whether she can go there directly from here.


2 mg IV morphine.


No additional diagnostics or therapeutics, POA and caregivers in agreement at 

this time, will admit then transition to hospice when able; not able tonight 

per case management.


Smoking Status: Current every day smoker


Constitutional: 


 Initial Vital Signs











Temperature (C)  36.1 C   01/28/18 14:50


 


Heart Rate  108 H  01/28/18 14:50


 


Respiratory Rate  12   01/28/18 14:50


 


Blood Pressure  147/94 H  01/28/18 14:50


 


O2 Sat (%)  86 L  01/28/18 14:50








 











O2 Delivery Mode               Room Air


 


O2 (L/minute)                  4














Allergies/Adverse Reactions: 


 





Sulfa (Sulfonamide Antibiotics) Allergy (Intermediate, Verified 10/27/17 15:29)


 








Home Medications: 














 Medication  Instructions  Recorded


 


Muscle Relaxers  08/17/15


 


Albuterol [Proventil Inhaler HFA 1 - 2 puffs IH Q4H #1 mdi 05/10/17





(*)]  


 


Docusate Sodium [Colace 100 MG (*)] 100 mg PO BID #30 cap 05/17/17


 


Lidocaine 5% [Lidoderm 5% Patch 2 ea TD DAILY #30 patch 05/17/17





(*)]  


 


oxyCODONE/APAP 5/325 [Percocet 1 - 2 tab PO Q4-6PRN PRN #12 tab 05/17/17





5/325 (*)]  














Medical Decision Making


Differential Diagnosis: 





Differential for altered mental status considered including but not limited to 

metabolic abnormality, cancer, infection, medication related.





- Data Points


Medications Given: 


 








Discontinued Medications





Morphine Sulfate (Morphine)  2 mg IVP EDNOW ONE


   Stop: 01/28/18 17:04


   Last Admin: 01/28/18 17:06 Dose:  2 mg








Departure





- Departure


Disposition: [facility]


Clinical Impression: 


 Metastatic breast cancer





Condition: Fair

## 2018-01-29 VITALS
OXYGEN SATURATION: 84 % | DIASTOLIC BLOOD PRESSURE: 67 MMHG | RESPIRATION RATE: 22 BRPM | HEART RATE: 105 BPM | SYSTOLIC BLOOD PRESSURE: 112 MMHG

## 2018-01-29 NOTE — PDMN
Medical Necessity


Medical necessity: Pt meets IP criteria per MD; est los >2 mn for eval/tx of 

pain r/t metastatic breast cancer; pt lethargic & non-verbal; admit for IV pain 

meds/antiemetics & hospice evaluation; hx muscular dystrophy; per H&P & order 1/ 28/18

## 2018-01-30 NOTE — ASDISCHSUM
----------------------------------------------

Discharge Information

----------------------------------------------

Plan Status:Hospice-Home                             Medically Cleared to Leave:

Discharge Date:2018 04:00 AM                   CM D/C Disposition:

ADT D/C Disposition:                          Projected Discharge Date:2018 04:00 AM

Transportation at D/C:                               Discharge Delay Reason:

Follow-Up Date:2018 04:00 AM                   Discharge Slot:

Final Diagnosis:

----------------------------------------------

Placement Information

----------------------------------------------

----------------------------------------------

Patient Contact Information

----------------------------------------------

Contact Name:MARILEE                            Relationship:Other

Address:                                             Home [phone]

                                                     Work Phone:

City:                                                [facility] Phone:

State/Zip Code:                                      Email:

----------------------------------------------

Financial Information

----------------------------------------------

Financial Class:MD

Primary Plan Desc[place] [place]        Primary Plan Number:[id number]

Secondary Plan Desc:                                 Secondary Plan Number:

 

 

----------------------------------------------

Assessment Information

----------------------------------------------

----------------------------------------------

[facility] CM Progress Note

----------------------------------------------

CM Note

 

CM Note                       

Notes:

Pt presented to the ED via EMS from home for pain related to chemo treatments and altered mental 

status. 



Patient's longtime friend and her former hospice RN, Shahla Us ([phone]), and other 

former caregiver and friend Rajni Jose ([phone]) arrived to the ED. Spoke with Lawrence at length regarding patient being started on hospice. Per Lawrence, [facility] 

([phone]) went to the ''s home yesterday and completed an informational session but at that 

time the pt was undecided whether she wanted to start hospice. 



[facility] followed up with pt, Rajni Gale and pt's daughter-in-law Xiomy Galidno ([phone]) today 

about admitting to hospice but since the patient's mental status had become more altered, [facility] would 


need patient's POA, her ex- Rian Avitia ([phone]) to sign the necessary paperwork to 


admit. Lawrence made multiple attempts to contact Rian today but he was on a flight back 

home to [place]. 



 This CM was able to speak to Rian who was on his way from [facility] to [facility]. Rian arrived to ED with POA 

paperwork (which was scanned into pt's e-chart). He is willing to sign paperwork to admit pt to 

hospice. RianShahla Laura, Xiomy and several friends/caregivers in the room agree that it would 

be best for patient to be started on hospice.



Spoke with Gillian at [facility] and she tried to see if an RN was available to complete an 

admission tonight and that the RN would have orders for pain control (so pt could potentially dc 

home with hospice tonight), but ultimately an RN was unavailable. 



Pt admitted for pain control with plans for [facility] to assess/admit at 10a.m. Rian is aware he needs to 


be present and will try to arrive by 9:45am. There was discussion about patient being directly 

admitted to [facility] if there is a bed available; this appears to be family and 

friend's preference if possible. 



CM to follow. 

 

Date Signed:  2018 05:37 PM

Electronically Signed By:Ellen Coto RN

 

 

----------------------------------------------

Intervention Information

----------------------------------------------

## 2022-08-09 NOTE — DX
Left Hand, Three Views



History: Pain. History of metastatic breast cancer.



Findings: There is diffuse demineralization. There is mild periarticular spurring at the first carpom
etacarpal joint and first metacarpophalangeal joint, indicating mild degenerative change. No evidence
 for periarticular erosion. An ossification is seen at the terminal tuft of the left fifth finger, a 
sequela from old trauma. Mild cortical thickening and sclerosis at the proximal fifth phalanx, probab
ly a sequela from old healed fracture. No evidence for acute fracture or dislocation. No evidence for
 aggressive osseous lesion.



Impression: Diffuse demineralization. No evidence for acute fracture. 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